# Patient Record
Sex: MALE | Race: BLACK OR AFRICAN AMERICAN
[De-identification: names, ages, dates, MRNs, and addresses within clinical notes are randomized per-mention and may not be internally consistent; named-entity substitution may affect disease eponyms.]

---

## 2020-07-28 ENCOUNTER — HOSPITAL ENCOUNTER (EMERGENCY)
Dept: HOSPITAL 97 - ER | Age: 30
Discharge: HOME | End: 2020-07-28
Payer: SELF-PAY

## 2020-07-28 VITALS — SYSTOLIC BLOOD PRESSURE: 132 MMHG | OXYGEN SATURATION: 97 % | DIASTOLIC BLOOD PRESSURE: 77 MMHG | TEMPERATURE: 97.3 F

## 2020-07-28 DIAGNOSIS — R36.9: Primary | ICD-10-CM

## 2020-07-28 PROCEDURE — 96372 THER/PROPH/DIAG INJ SC/IM: CPT

## 2020-07-28 PROCEDURE — 99283 EMERGENCY DEPT VISIT LOW MDM: CPT

## 2020-07-28 NOTE — EDPHYS
Physician Documentation                                                                           

 [facility]                                                                 

Name: Theron Dowell                                                                           

Age: 29 yrs                                                                                       

Sex: Male                                                                                         

: 1990                                                                                   

MRN: [id number]                                                                                   

Arrival Date: 2020                                                                          

Time: 13:11                                                                                       

Account#: [id number]                                                                            

Bed 26                                                                                            

Private MD:                                                                                       

ED Physician Mervin Duarte                                                                      

HPI:                                                                                              

                                                                                             

14:11 This 29 yrs old Black Male presents to ER via Ambulatory with complaints of STD         malinda 

      Exposure.                                                                                   

14:11 The patient presents with urinary symptoms, dribbling of urine, dysuria. Onset: The     malinda 

      symptoms/episode began/occurred 3 day(s) ago. Modifying factors: The symptoms are           

      alleviated by nothing, the symptoms are aggravated by urinating, sexual intercourse.        

      Associated signs and symptoms: The patient has no apparent associated signs or              

      symptoms. Severity of symptoms: At their worst the symptoms were mild, in the emergency     

      department the symptoms are unchanged. The patient has not experienced similar symptoms     

      in the past.                                                                                

                                                                                                  

Historical:                                                                                       

- Allergies:                                                                                      

13:16 No Known Allergies;                                                                     tw2 

- Home Meds:                                                                                      

13:16 None [Active];                                                                          tw2 

- PMHx:                                                                                           

13:16 None;                                                                                   tw2 

- PSHx:                                                                                           

13:16 None;                                                                                   tw2 

                                                                                                  

- Immunization history:: Adult Immunizations.                                                     

- Social history:: Smoking status: Patient denies any tobacco usage or history of.                

- Family history:: not pertinent.                                                                 

                                                                                                  

                                                                                                  

ROS:                                                                                              

14:11 Constitutional: Negative for fever, chills, and weight loss, Eyes: Negative for injury, malinda 

      pain, redness, and discharge, ENT: Negative for injury, pain, and discharge, Neck:          

      Negative for injury, pain, and swelling, Cardiovascular: Negative for chest pain,           

      palpitations, and edema, Respiratory: Negative for shortness of breath, cough,              

      wheezing, and pleuritic chest pain, Abdomen/GI: Negative for abdominal pain, nausea,        

      vomiting, diarrhea, and constipation, Back: Negative for injury and pain, MS/Extremity:     

      Negative for injury and deformity, Skin: Negative for injury, rash, and discoloration,      

      Neuro: Negative for headache, weakness, numbness, tingling, and seizure, Psych:             

      Negative for depression, anxiety, suicide ideation, homicidal ideation, and                 

      hallucinations, Allergy/Immunology: Negative for hives, rash, and allergies, Endocrine:     

      Negative for neck swelling, polydipsia, polyuria, polyphagia, and marked weight             

      changes, Hematologic/Lymphatic: Negative for swollen nodes, abnormal bleeding, and          

      unusual bruising.                                                                           

14:11 : Positive for urinary symptoms, urinary frequency, burning with urination, penile        

      discharge.                                                                                  

                                                                                                  

Exam:                                                                                             

14:11 Constitutional:  This is a well developed, well nourished patient who is awake, alert,  malinda 

      and in no acute distress. Head/Face:  Normocephalic, atraumatic. Eyes:  Pupils equal        

      round and reactive to light, extra-ocular motions intact.  Lids and lashes normal.          

      Conjunctiva and sclera are non-icteric and not injected.  Cornea within normal limits.      

      Periorbital areas with no swelling, redness, or edema. ENT:  Nares patent. No nasal         

      discharge, no septal abnormalities noted.  Tympanic membranes are normal and external       

      auditory canals are clear.  Oropharynx with no redness, swelling, or masses, exudates,      

      or evidence of obstruction, uvula midline.  Mucous membranes moist. Neck:  Trachea          

      midline, no thyromegaly or masses palpated, and no cervical lymphadenopathy.  Supple,       

      full range of motion without nuchal rigidity, or vertebral point tenderness.  No            

      Meningismus. Chest/axilla:  Normal chest wall appearance and motion.  Nontender with no     

      deformity.  No lesions are appreciated. Cardiovascular:  Regular rate and rhythm with a     

      normal S1 and S2.  No gallops, murmurs, or rubs.  Normal PMI, no JVD.  No pulse             

      deficits. Respiratory:  Lungs have equal breath sounds bilaterally, clear to                

      auscultation and percussion.  No rales, rhonchi or wheezes noted.  No increased work of     

      breathing, no retractions or nasal flaring. Abdomen/GI:  Soft, non-tender, with normal      

      bowel sounds.  No distension or tympany.  No guarding or rebound.  No evidence of           

      tenderness throughout. Back:  No spinal tenderness.  No costovertebral tenderness.          

      Full range of motion. Skin:  Warm, dry with normal turgor.  Normal color with no            

      rashes, no lesions, and no evidence of cellulitis. MS/ Extremity:  Pulses equal, no         

      cyanosis.  Neurovascular intact.  Full, normal range of motion. Neuro:  Awake and           

      alert, GCS 15, oriented to person, place, time, and situation.  Cranial nerves II-XII       

      grossly intact.  Motor strength 5/5 in all extremities.  Sensory grossly intact.            

      Cerebellar exam normal.  Normal gait. Psych:  Awake, alert, with orientation to person,     

      place and time.  Behavior, mood, and affect are within normal limits.                       

14:11 : CVA tenderness, is absent, Male external genitalia: Circumcision noted. Bladder: is     

      normal, Sexual behavior: the patient is sexually active, and reports multiple partners.     

                                                                                                  

Vital Signs:                                                                                      

13:14  / 77; Pulse 74; Resp 17; Temp 97.3(TE); Pulse Ox 97% on R/A; Weight 90.72 kg     tw2 

      (R); Height 5 ft. 10 in. (177.80 cm); Pain 0/10;                                            

13:14 Body Mass Index 28.70 (90.72 kg, 177.80 cm)                                             tw2 

                                                                                                  

MDM:                                                                                              

13:42 Patient medically screened.                                                             malinda 

14:14 Data reviewed: vital signs, nurses notes.                                               [facility] 

14:14 Differential diagnosis: UTI, prostatitis, urethritis. Data interpreted: Cardiac         malinda 

      monitor: not applicable for this patient encounter. rate is 74 beats/min, Pulse             

      oximetry: on room air is 97 %. Counseling: I had a detailed discussion with the patient     

      and/or guardian regarding: the historical points, exam findings, and any diagnostic         

      results supporting the discharge/admit diagnosis, the need for outpatient follow up,        

      for definitive care, a family practitioner. ED course: no sex, follow up pcp.               

                                                                                                  

Administered Medications:                                                                         

14:33 Drug: Rocephin (cefTRIAXone) 500 mg Route: IM; Site: right ventrogluteal;               vc  

14:50 Follow up: Response: No adverse reaction                                                vc  

14:33 Drug: Cipro 500 mg Route: PO;                                                           vc  

14:50 Follow up: Response: No adverse reaction                                                vc  

14:33 Drug: Zithromax 1 grams Route: PO;                                                      vc  

14:50 Follow up: Response: No adverse reaction                                                vc  

                                                                                                  

                                                                                                  

Disposition:                                                                                      

20 14:16 Discharged to Home. Impression: Urethral discharge, Urethral discharge,            

  unspecified.                                                                                    

- Condition is Stable.                                                                            

- Discharge Instructions: Sexually Transmitted Disease, Sexually Transmitted Disease,             

  Easy-to-Read, Urethritis, Adult.                                                                

- Prescriptions for Doxycycline Hyclate 100 mg Oral Tablet - take 1 tablet by ORAL                

  route every 12 hours; 20 tablet.                                                                

- Medication Reconciliation Form, Thank You Letter, Antibiotic Education, Prescription            

  Opioid Use form.                                                                                

- Follow up: Private Physician; When: 2 - 3 days; Reason: Recheck today's complaints,             

  Continuance of care, Re-evaluation by your physician.                                           

- Problem is new.                                                                                 

- Symptoms have improved.                                                                         

                                                                                                  

                                                                                                  

                                                                                                  

Signatures:                                                                                       

Mervin Duarte MD MD cha Wise, Tara, RN                          RN   tw2                                                  

Catarina Stubbs RN                    RN   vc                                                   

                                                                                                  

Corrections: (The following items were deleted from the chart)                                    

14:53 14:16 2020 14:16 Discharged to Home. Impression: Urethral discharge; Urethral     vc  

      discharge, unspecified. Condition is Stable. Forms are Medication Reconciliation Form,      

      Thank You Letter, Antibiotic Education, Prescription Opioid Use. Follow up: Private         

      Physician; When: 2 - 3 days; Reason: Recheck today's complaints, Continuance of care,       

      Re-evaluation by your physician. Problem is new. Symptoms have improved. malinda                

                                                                                                  

**************************************************************************************************

## 2020-07-28 NOTE — ER
Nurse's Notes                                                                                     

 [facility]                                                                 

Name: Theron Dowell                                                                           

Age: 29 yrs                                                                                       

Sex: Male                                                                                         

: 1990                                                                                   

MRN: [id number]                                                                                   

Arrival Date: 2020                                                                          

Time: 13:11                                                                                       

Account#: [id number]                                                                            

Bed 26                                                                                            

Private MD:                                                                                       

Diagnosis: Urethral discharge;Urethral discharge, unspecified                                     

                                                                                                  

Presentation:                                                                                     

                                                                                             

13:14 Chief complaint: Patient states: i think i have a sexually transmitted disease, i had   tw2 

      sex with the same girl last week as my friends and we think it is gonorrhea, i have         

      been leaking green stuff as well. Coronavirus screen: Patient denies a cough. Patient       

      denies shortness of breath or difficulty breathing. Patient denies measured and/or          

      subjective temperature greater than 100.4F prior to today's visit. Patient denies           

      travel on a cruise ship or to a country the [facility] currently lists as an affected area.        

      Patient denies contact with known and/or suspected case of COVID-19. Ebola Screen:          

      Patient denies travel to an Ebola-affected area in the 21 days before illness onset.        

      Initial Sepsis Screen: Does the patient meet any 2 criteria? No. Patient's initial          

      sepsis screen is negative. Does the patient have a suspected source of infection? No.       

      Patient's initial sepsis screen is negative. Risk Assessment: Do you want to hurt           

      yourself or someone else? Patient reports no desire to harm self or others. Onset of        

      symptoms was 2020.                                                                 

13:14 Method Of Arrival: Ambulatory                                                           tw2 

13:14 Acuity: YG 4                                                                           tw2 

                                                                                                  

Triage Assessment:                                                                                

13:18 General: Appears in no apparent distress. Behavior is calm, cooperative, appropriate    tw2 

      for age. Pain: Denies pain.                                                                 

                                                                                                  

Historical:                                                                                       

- Allergies:                                                                                      

13:16 No Known Allergies;                                                                     tw2 

- Home Meds:                                                                                      

13:16 None [Active];                                                                          tw2 

- PMHx:                                                                                           

13:16 None;                                                                                   tw2 

- PSHx:                                                                                           

13:16 None;                                                                                   tw2 

                                                                                                  

- Immunization history:: Adult Immunizations.                                                     

- Social history:: Smoking status: Patient denies any tobacco usage or history of.                

- Family history:: not pertinent.                                                                 

                                                                                                  

                                                                                                  

Screenin:37 Abuse screen: Denies threats or abuse. Nutritional screening: No deficits noted.        tw2 

      Tuberculosis screening: No symptoms or risk factors identified. Fall Risk None              

      identified.                                                                                 

                                                                                                  

Assessment:                                                                                       

13:36 General: Appears in no apparent distress. Behavior is calm, cooperative, appropriate    tw2 

      for age. Pain: Denies pain. Neuro: Level of Consciousness is awake, alert, obeys            

      commands, Oriented to person, place, time, situation. Cardiovascular: Patient's skin is     

      warm and dry. Respiratory: Airway is patent Respiratory effort is even, unlabored,          

      Respiratory pattern is regular, symmetrical. GI: No signs and/or symptoms were reported     

      involving the gastrointestinal system. : Reports discharge, from penis that is green,     

      yellow. EENT: No signs and/or symptoms were reported regarding the EENT system. Derm:       

      No signs and/or symptoms reported regarding the dermatologic system. Musculoskeletal:       

      Range of motion: intact in all extremities.                                                 

14:34 Reassessment: Discharge pending shot time.                                              vc  

                                                                                                  

Vital Signs:                                                                                      

13:14  / 77; Pulse 74; Resp 17; Temp 97.3(TE); Pulse Ox 97% on R/A; Weight 90.72 kg     tw2 

      (R); Height 5 ft. 10 in. (177.80 cm); Pain 0/10;                                            

13:14 Body Mass Index 28.70 (90.72 kg, 177.80 cm)                                             tw2 

                                                                                                  

ED Course:                                                                                        

13:11 Patient arrived in ED.                                                                  fj1 

13:15 Triage completed.                                                                       tw2 

13:15 Arm band placed on.                                                                     tw2 

13:37 Bed in low position. Call light in reach.                                               tw2 

13:41 Mervin Duarte MD is Attending Physician.                                             malinda 

14:13 Catarina Stubbs, RN is Primary Nurse.                                                  vc  

14:52 No provider procedures requiring assistance completed. Patient did not have IV access   vc  

      during this emergency room visit.                                                           

                                                                                                  

Administered Medications:                                                                         

14:33 Drug: Rocephin (cefTRIAXone) 500 mg Route: IM; Site: right ventrogluteal;               vc  

14:50 Follow up: Response: No adverse reaction                                                vc  

14:33 Drug: Cipro 500 mg Route: PO;                                                           vc  

14:50 Follow up: Response: No adverse reaction                                                vc  

14:33 Drug: Zithromax 1 grams Route: PO;                                                      vc  

14:50 Follow up: Response: No adverse reaction                                                vc  

                                                                                                  

                                                                                                  

Outcome:                                                                                          

14:16 Discharge ordered by MD.                                                                malinda 

14:52 Discharged to home ambulatory.                                                          vc  

14:52 Condition: good                                                                             

14:52 Discharge instructions given to patient, Instructed on discharge instructions, follow       

      up and referral plans. medication usage, Demonstrated understanding of instructions,        

      follow-up care, medications, Prescriptions given X 1.                                       

14:53 Patient left the ED.                                                                    vc  

                                                                                                  

Signatures:                                                                                       

Mervin Duarte MD MD cha Wise, Tara RN                          RN   tw2                                                  

Catarina Stubbs RN                    RN   Reggie Mcnulty                                 fj1                                                  

                                                                                                  

**************************************************************************************************

## 2021-01-12 ENCOUNTER — HOSPITAL ENCOUNTER (EMERGENCY)
Dept: HOSPITAL 97 - ER | Age: 31
Discharge: HOME | End: 2021-01-12
Payer: SELF-PAY

## 2021-01-12 VITALS — SYSTOLIC BLOOD PRESSURE: 148 MMHG | DIASTOLIC BLOOD PRESSURE: 96 MMHG

## 2021-01-12 VITALS — OXYGEN SATURATION: 99 % | TEMPERATURE: 97.8 F

## 2021-01-12 DIAGNOSIS — A64: Primary | ICD-10-CM

## 2021-01-12 PROCEDURE — 99283 EMERGENCY DEPT VISIT LOW MDM: CPT

## 2021-01-12 PROCEDURE — 96372 THER/PROPH/DIAG INJ SC/IM: CPT

## 2021-01-12 NOTE — XMS REPORT
Continuity of Care Document

                           Created on:2021



Patient:MARIA M CONTE

Sex:Male

:1990

External Reference #:[id number]





Demographics







                          Address                   [address] [place] [address]

 

                          Home Phone                [phone]

 

                          Work Phone                [phone]

 

                          Mobile Phone              [phone]

 

                          Email Address             NONE

 

                          Preferred Language        English

 

                          Marital Status            Unknown

 

                          Gnosticism Affiliation     BAP

 

                          Race                      Unknown

 

                          Additional Race(s)        Unavailable



                                                    Black or 

 

                          Ethnic Group              Not  or 









Author







                          Organization              [facility]

t

 

                          Address                   [address]



                                                    [place] [address]

 

                          Phone                     [phone]









Support







                Name            Relationship    Address         Phone

 

                Contact         Other           Unavailable     [phone]

 

                Delmer         Mother          Unavailable     [phone]









Care Team Providers







                    Name                Role                Phone

 

                    Fabrice PUENTE          Attending Clinician [phone]









Problems

This patient has no known problems.



Allergies, Adverse Reactions, Alerts

This patient has no known allergies or adverse reactions.



Medications

This patient has no known medications.



Procedures

This patient has no known procedures.



Encounters







        Start   End     Encounter Admission Attending Care    Care    Encounter 

Source



        Date/Time Date/Time Type    Type    Clinicians Facility Department ID   

   

 

        2020 Emergency         MARBIN Avina    [phone] [id number]

[id number] 



        14:10:58 15:32:00                 Moira Andre [id number]         



                                                [place] [id number]         



                                                [place]  [phone]         



                                                        084             







Results

This patient has no known results.

## 2021-01-12 NOTE — EDPHYS
Physician Documentation                                                                           

 [facility]                                                                 

Name: Theron Dowell                                                                           

Age: 30 yrs                                                                                       

Sex: Male                                                                                         

: 1990                                                                                   

MRN: [id number]                                                                                   

Arrival Date: 2021                                                                          

Time: 16:51                                                                                       

Account#: [id number]                                                                            

Bed Waiting                                                                                       

Private MD:                                                                                       

Mervin Nance                                                                      

HPI:                                                                                              

                                                                                             

17:01 This 30 yrs old Black Male presents to ER via Ambulatory with complaints of STD         kb  

      Exposure.                                                                                   

17:01 The patient presents with a possible STD exposure, symptoms include dysuria, green      kb  

      penile discharge. Onset: The symptoms/episode began/occurred 2 day(s) ago. Modifying        

      factors: The symptoms are alleviated by nothing, the symptoms are aggravated by             

      nothing. Associated signs and symptoms: The patient has no apparent associated signs or     

      symptoms. Severity of symptoms: At their worst the symptoms were moderate, in the           

      emergency department the symptoms are unchanged. The patient has not experienced            

      similar symptoms in the past. The patient has not recently seen a physician. Pt reports     

      he believes he has gonorrhea because he has had burning with urination and green            

      discharge for 2 days.                                                                       

                                                                                                  

Historical:                                                                                       

- Allergies:                                                                                      

16:57 No Known Allergies;                                                                     ss  

- Home Meds:                                                                                      

16:57 None [Active];                                                                          ss  

- PMHx:                                                                                           

16:57 None;                                                                                   ss  

- PSHx:                                                                                           

16:57 None;                                                                                   ss  

                                                                                                  

- Immunization history:: Adult Immunizations up to date.                                          

- Social history:: Smoking status: Patient denies any tobacco usage or history of.                

                                                                                                  

                                                                                                  

ROS:                                                                                              

17:00 Constitutional: Negative for fever, chills, and weight loss, Cardiovascular: Negative   kb  

      for chest pain, palpitations, and edema, Respiratory: Negative for shortness of breath,     

      cough, wheezing, and pleuritic chest pain, Abdomen/GI: Negative for abdominal pain,         

      nausea, vomiting, diarrhea, and constipation, MS/Extremity: Negative for injury and         

      deformity, Skin: Negative for injury, rash, and discoloration, Neuro: Negative for          

      headache, weakness, numbness, tingling, and seizure.                                        

17:00 : Positive for burning with urination, penile discharge.                                  

                                                                                                  

Exam:                                                                                             

17:00 Constitutional:  This is a well developed, well nourished patient who is awake, alert,  kb  

      and in no acute distress. Head/Face:  Normocephalic, atraumatic. Chest/axilla:  Normal      

      chest wall appearance and motion.  Nontender with no deformity.  No lesions are             

      appreciated. Cardiovascular:  Regular rate and rhythm with a normal S1 and S2.  No          

      gallops, murmurs, or rubs.  Normal PMI, no JVD.  No pulse deficits. Respiratory:  Lungs     

      have equal breath sounds bilaterally, clear to auscultation and percussion.  No rales,      

      rhonchi or wheezes noted.  No increased work of breathing, no retractions or nasal          

      flaring. Abdomen/GI:  Soft, non-tender, with normal bowel sounds.  No distension or         

      tympany.  No guarding or rebound.  No evidence of tenderness throughout. Skin:  Warm,       

      dry with normal turgor.  Normal color with no rashes, no lesions, and no evidence of        

      cellulitis. MS/ Extremity:  Pulses equal, no cyanosis.  Neurovascular intact.  Full,        

      normal range of motion. Neuro:  Awake and alert, GCS 15, oriented to person, place,         

      time, and situation.  Cranial nerves II-XII grossly intact.  Motor strength 5/5 in all      

      extremities.  Sensory grossly intact.  Cerebellar exam normal.  Normal gait.                

                                                                                                  

Vital Signs:                                                                                      

16:57 Pulse 89; Resp 16; Temp 97.8(TE); Pulse Ox 99% on R/A; Weight 92.99 kg; Height 5 ft. 11 ss  

      in. (180.34 cm); Pain 0/10;                                                                 

16:59  / 96;                                                                            ss  

16:57 Body Mass Index 28.59 (92.99 kg, 180.34 cm)                                             ss  

                                                                                                  

MDM:                                                                                              

17:00 Data reviewed: vital signs, nurses notes. Data interpreted: Pulse oximetry: on room air kb  

      is 99 %. Interpretation: normal. Counseling: I had a detailed discussion with the           

      patient and/or guardian regarding: the historical points, exam findings, and any            

      diagnostic results supporting the discharge/admit diagnosis, the need for outpatient        

      follow up, a family practitioner, to return to the emergency department if symptoms         

      worsen or persist or if there are any questions or concerns that arise at home.             

17:03 Patient medically screened.                                                             kb  

                                                                                                  

Administered Medications:                                                                         

17:08 Drug: Zithromax 1 grams Route: PO;                                                      ss  

17:25 Follow up: Response: No adverse reaction                                                ss  

17:08 Drug: Rocephin (cefTRIAXone) 250 mg Route: IM; Site: left gluteus;                      ss  

17:25 Follow up: Response: No adverse reaction; Medication administered at discharge.           

                                                                                                  

                                                                                                  

Disposition:                                                                                      

18:40 Co-signature as Attending Physician, Mervin Duarte MD I agree with the assessment and  malinda 

      plan of care.                                                                               

                                                                                                  

Disposition:                                                                                      

21 17:03 Discharged to Home. Impression: Unspecified sexually transmitted disease.          

- Condition is Stable.                                                                            

- Discharge Instructions: Sexually Transmitted Disease, Easy-to-Read.                             

                                                                                                  

- Medication Reconciliation Form, Thank You Letter, Antibiotic Education, Prescription            

  Opioid Use form.                                                                                

- Follow up: Emergency Department; When: As needed; Reason: Worsening of condition.               

  Follow up: Private Physician; When: 2 - 3 days; Reason: Recheck today's complaints,             

  Continuance of care, Re-evaluation by your physician.                                           

                                                                                                  

                                                                                                  

                                                                                                  

Signatures:                                                                                       

Stephenie Hoffmann, FNP-C                 FNP-Evanb                                                   

Mervin Duarte MD MD cha Smirch, Shelby, RN                      RN   ss                                                   

                                                                                                  

Corrections: (The following items were deleted from the chart)                                    

17:26 17:03 2021 17:03 Discharged to Home. Impression: Unspecified sexually transmitted ss  

      disease. Condition is Stable. Forms are Medication Reconciliation Form, Thank You           

      Letter, Antibiotic Education, Prescription Opioid Use. Follow up: Emergency Department;     

      When: As needed; Reason: Worsening of condition. Follow up: Private Physician; When: 2      

      - 3 days; Reason: Recheck today's complaints, Continuance of care, Re-evaluation by         

      your physician. kb                                                                          

                                                                                                  

**************************************************************************************************

## 2021-01-12 NOTE — ER
Nurse's Notes                                                                                     

 [facility]                                                                 

Name: Theron Dowell                                                                           

Age: 30 yrs                                                                                       

Sex: Male                                                                                         

: 1990                                                                                   

MRN: [id number]                                                                                   

Arrival Date: 2021                                                                          

Time: 16:51                                                                                       

Account#: [id number]                                                                            

Bed Waiting                                                                                       

Private MD:                                                                                       

Diagnosis: Unspecified sexually transmitted disease                                               

                                                                                                  

Presentation:                                                                                     

                                                                                             

16:56 Chief complaint: Patient states: "I think I got gonorrhea. I've been having burning     ss  

      with urination.". Coronavirus screen: Client denies travel out of the [place] in the last      

      14 days. Ebola Screen: Patient denies exposure to infectious person. Patient denies         

      travel to an Ebola-affected area in the 21 days before illness onset. Initial Sepsis        

      Screen: Does the patient meet any 2 criteria? No. Patient's initial sepsis screen is        

      negative. Does the patient have a suspected source of infection? No. Patient's initial      

      sepsis screen is negative. Risk Assessment: Do you want to hurt yourself or someone         

      else? Patient reports no desire to harm self or others. Onset of symptoms was January       

      10, 2021.                                                                                   

16:56 Method Of Arrival: Ambulatory                                                           ss  

16:56 Acuity: YG 4                                                                           ss  

                                                                                                  

Historical:                                                                                       

- Allergies:                                                                                      

16:57 No Known Allergies;                                                                     ss  

- Home Meds:                                                                                      

16:57 None [Active];                                                                          ss  

- PMHx:                                                                                           

16:57 None;                                                                                   ss  

- PSHx:                                                                                           

16:57 None;                                                                                   ss  

                                                                                                  

- Immunization history:: Adult Immunizations up to date.                                          

- Social history:: Smoking status: Patient denies any tobacco usage or history of.                

                                                                                                  

                                                                                                  

Screenin:56 Abuse screen: Denies threats or abuse. Denies injuries from another. Nutritional        ss  

      screening: No deficits noted. Tuberculosis screening: No symptoms or risk factors           

      identified. Fall Risk None identified.                                                      

                                                                                                  

Assessment:                                                                                       

16:56 General: Appears in no apparent distress. comfortable, Behavior is calm, cooperative.   ss  

      General: Denies fever, feeling ill, fatigue, chills. Pain: Denies pain. Neuro: Level of     

      Consciousness is awake, alert, obeys commands, Oriented to person, place, time,             

      situation. Cardiovascular: Capillary refill < 3 seconds is brisk in bilateral fingers.      

      Respiratory: Airway is patent Respiratory effort is even, unlabored, Respiratory            

      pattern is regular, symmetrical. GI: Patient currently denies. : Reports discharge,       

      from penis that is since x 2 days. EENT: Nares are clear. Derm: Skin is intact, is          

      healthy with good turgor, Skin is pink, warm \T\ dry. normal. Musculoskeletal:              

      Circulation, motion, and sensation intact. Range of motion: intact in all extremities,      

      Swelling absent.                                                                            

                                                                                                  

Vital Signs:                                                                                      

16:57 Pulse 89; Resp 16; Temp 97.8(TE); Pulse Ox 99% on R/A; Weight 92.99 kg; Height 5 ft. 11 ss  

      in. (180.34 cm); Pain 0/10;                                                                 

16:59  / 96;                                                                            ss  

16:57 Body Mass Index 28.59 (92.99 kg, 180.34 cm)                                             ss  

                                                                                                  

ED Course:                                                                                        

16:51 Patient arrived in ED.                                                                  as  

16:54 [facility]                                                        kb  

16:54 Mervin Duarte MD is Attending Physician.                                             kb  

16:56 Triage completed.                                                                       ss  

16:56 Patient has correct armband on for positive identification. Bed in low position. Call   ss  

      light in reach.                                                                             

16:56 No provider procedures requiring assistance completed. Patient did not have IV access   ss  

      during this emergency room visit.                                                           

16:57 Arm band placed on right wrist.                                                         ss  

17:25 Zonia Stearns, RN is Primary Nurse.                                                    ss  

                                                                                                  

Administered Medications:                                                                         

17:08 Drug: Zithromax 1 grams Route: PO;                                                      ss  

17:25 Follow up: Response: No adverse reaction                                                ss  

17:08 Drug: Rocephin (cefTRIAXone) 250 mg Route: IM; Site: left gluteus;                      ss  

17:25 Follow up: Response: No adverse reaction; Medication administered at discharge.         ss  

                                                                                                  

                                                                                                  

Outcome:                                                                                          

17:03 Discharge ordered by MD.                                                                kb  

17:26 Discharged to home ambulatory.                                                          ss  

17:26 Condition: good                                                                             

17:26 Discharge instructions given to patient, family, Instructed on discharge instructions,      

      follow up and referral plans. Demonstrated understanding of instructions, follow-up         

      care.                                                                                       

17:26 Patient left the ED.                                                                    ss  

                                                                                                  

Signatures:                                                                                       

Stephenie Hoffmann FNP-C FNP-Maria Esther Mejia                             as                                                   

Zonia Stearns, RN                      RN   ss                                                   

                                                                                                  

**************************************************************************************************

## 2022-04-20 ENCOUNTER — HOSPITAL ENCOUNTER (EMERGENCY)
Dept: HOSPITAL 97 - ER | Age: 32
Discharge: HOME | End: 2022-04-20
Payer: SELF-PAY

## 2022-04-20 VITALS — TEMPERATURE: 97.9 F | OXYGEN SATURATION: 100 %

## 2022-04-20 VITALS — SYSTOLIC BLOOD PRESSURE: 129 MMHG | DIASTOLIC BLOOD PRESSURE: 86 MMHG

## 2022-04-20 DIAGNOSIS — F17.210: ICD-10-CM

## 2022-04-20 DIAGNOSIS — N34.2: Primary | ICD-10-CM

## 2022-04-20 PROCEDURE — 96372 THER/PROPH/DIAG INJ SC/IM: CPT

## 2022-04-20 PROCEDURE — 81003 URINALYSIS AUTO W/O SCOPE: CPT

## 2022-04-20 PROCEDURE — 99283 EMERGENCY DEPT VISIT LOW MDM: CPT

## 2022-04-20 NOTE — ER
Nurse's Notes                                                                                     

 [facility]                                                                 

Name: Theron Dowell                                                                           

Age: 31 yrs                                                                                       

Sex: Male                                                                                         

: 1990                                                                                   

MRN: [id number]                                                                                   

Arrival Date: 2022                                                                          

Time: 10:07                                                                                       

Account#: [id number]                                                                            

Bed Treatment                                                                                     

Private MD:                                                                                       

Diagnosis: Other urethritis-gc;Dysuria                                                            

                                                                                                  

Presentation:                                                                                     

                                                                                             

10:36 Chief complaint: Patient states: Pt c/o burning with urination and green discharge from ab2 

      penis for 3 days now. Coronavirus screen: Vaccine status: Patient reports being             

      unvaccinated. Client denies travel out of the [place] in the last 14 days. At this time,       

      the client does not indicate any symptoms associated with coronavirus-19. Ebola Screen:     

      Patient negative for fever greater than or equal to 101.5 degrees Fahrenheit, and           

      additional compatible Ebola Virus Disease symptoms Patient denies exposure to               

      infectious person. Patient denies travel to an Ebola-affected area in the 21 days           

      before illness onset. No symptoms or risks identified at this time. Initial Sepsis          

      Screen: Does the patient meet any 2 criteria? No. Patient's initial sepsis screen is        

      negative. Does the patient have a suspected source of infection? No. Patient's initial      

      sepsis screen is negative. Risk Assessment: Do you want to hurt yourself or someone         

      else? Patient reports no desire to harm self or others. Onset of symptoms is unknown.       

10:36 Method Of Arrival: Ambulatory                                                           ab2 

10:36 Acuity: YG 4                                                                           ab2 

                                                                                                  

Historical:                                                                                       

- Allergies:                                                                                      

10:37 No Known Allergies;                                                                     ab2 

- PMHx:                                                                                           

10:37 None;                                                                                   ab2 

                                                                                                  

- Immunization history:: Adult Immunizations up to date.                                          

- Social history:: Smoking status: Patient reports the use of cigarette tobacco                   

  products, smokes one-half pack cigarettes per day.                                              

- Family history:: not pertinent.                                                                 

                                                                                                  

                                                                                                  

Screening:                                                                                        

10:38 Abuse screen: Denies threats or abuse. Denies injuries from another. Nutritional        ab2 

      screening: No deficits noted. Tuberculosis screening: No symptoms or risk factors           

      identified. Fall Risk None identified.                                                      

                                                                                                  

Assessment:                                                                                       

10:37 General: Appears in no apparent distress. comfortable, Behavior is calm, cooperative,   ab2 

      appropriate for age. Pain: Complains of pain in pelvis Pain does not radiate. Pain          

      currently is 3 out of 10 on a pain scale. Neuro: Level of Consciousness is awake,           

      alert, obeys commands, Oriented to person, place, time, situation, Appropriate for age      

       are equal bilaterally Moves all extremities. Gait is steady, Speech is normal,        

      Facial symmetry appears normal. Cardiovascular: No deficits noted. Denies chest pain,       

      shortness of breath, Heart tones S1 S2 present Patient's skin is warm and dry.              

      Respiratory: Airway is patent Respiratory effort is even, unlabored, Respiratory            

      pattern is regular, symmetrical, Breath sounds are clear bilaterally. GI: No deficits       

      noted. No signs and/or symptoms were reported involving the gastrointestinal system.        

      : Reports burning with urination, discharge, from penis that is green, Patient is         

      sexually active. Derm: No deficits noted. No signs and/or symptoms reported regarding       

      the dermatologic system.                                                                    

                                                                                                  

Vital Signs:                                                                                      

10:36  / 89; Pulse 72; Resp 18; Temp 97.9; Pulse Ox 100% on R/A; Weight 88.45 kg;       ab2 

      Height 5 ft. 11 in. (180.34 cm); Pain 3/10;                                                 

10:55  / 86; Pulse 74; Resp 17; Pulse Ox 100% on R/A;                                   ab2 

10:36 Body Mass Index 27.20 (88.45 kg, 180.34 cm)                                             ab2 

                                                                                                  

ED Course:                                                                                        

10:07 Patient arrived in ED.                                                                  am2 

10:19 Mervin Duarte MD is Attending Physician.                                             [facility] 

10:23 Hernesto Olmos is Primary Nurse.                                                    ab2 

10:37 Triage completed.                                                                       ab2 

10:38 Arm band placed on right wrist.                                                         ab2 

10:38 No provider procedures requiring assistance completed.                                  ab2 

10:39 Patient has correct armband on for positive identification. Bed in low position. Call   ab2 

      light in reach. Side rails up X2.                                                           

10:55 Patient did not have IV access during this emergency room visit.                        ab2 

                                                                                                  

Administered Medications:                                                                         

10:35 Drug: Doxycycline 200 mg Route: PO;                                                     ab2 

10:36 Drug: Rocephin (cefTRIAXone) 1 grams Route: IM; Site: left vastus lateralis;            ab2 

10:36 Drug: Zithromax (azithromycin) 1 grams Route: PO;                                       ab2 

                                                                                                  

                                                                                                  

Outcome:                                                                                          

10:37 Discharge ordered by MD.                                                                malinda 

10:55 Discharged to home ambulatory.                                                          ab2 

10:55 Condition: good                                                                             

10:55 Discharge instructions given to patient, Instructed on discharge instructions, follow       

      up and referral plans. medication usage, safe sex practices, Demonstrated understanding     

      of instructions, follow-up care, medications, Prescriptions given X 1.                      

10:55 Patient left the ED.                                                                    ab2 

                                                                                                  

Signatures:                                                                                       

Mervin Duarte MD MD cha Moreno, Amanda am2 Bleininger, Alexis                           ab2                                                  

                                                                                                  

**************************************************************************************************

## 2022-04-20 NOTE — XMS REPORT
Continuity of Care Document

                            Created on:2022



Patient:MARIA M CONTE

Sex:Male

:1990

External Reference #:[id number]





Demographics







                          Address                   [address]



                                                    [place] [address]

 

                          Home Phone                [phone]

 

                          Mobile Phone              [phone]

 

                          Email Address             NONE

 

                          Preferred Language        English

 

                          Marital Status            Unknown

 

                          [employer] Affiliation     B[employer]

 

                          Race                      Unknown

 

                          Additional Race(s)        Black or 

 

                          Ethnic Group              Not  or 









Author







                          Organization              [facility]

t

 

                          Address                   [address]



                                                    [place] [address]

 

                          Phone                     [phone]









Support







                Name            Relationship    Address         Phone

 

                Contact         Other           Unavailable     [phone]

 

                Delmer         Mother          Unavailable     [phone]









Care Team Providers







                    Name                Role                Phone

 

                    Pcp,  Does Not Have A Primary Care Physician [phone]

 

                    KENZIE Saleh DO     Attending Clinician [phone]

 

                    KENZIE SALEH        Attending Clinician Unavailable

 

                    Fabrice OWENSP          Attending Clinician [phone]

 

                    FABRICE              Attending Clinician Unavailable









Problems







       Condition Condition Condition Status Onset  Resolution Last   Treating Co

mments 

Source



       Name   Details Category        Date   Date   Treatment Clinician        



                                                 Date                 

 

       No known No known Disease                                           Unive

rs



       active active                                                  ity of



       problems problems                                                  [facility]







Allergies, Adverse Reactions, Alerts







       Allergy Allergy Status Severity Reaction(s) Onset  Inactive Treating Comm

ents 

Source



       Name   Type                        Date   Date   Clinician        

 

       NO KNOWN Drug   Active                                           [facility]



       ALLERGIE Class                                                   ity of



       S                                                              [facility]







Social History







           Social Habit Start Date Stop Date  Quantity   Comments   Source

 

           Exposure to                       Not sure              [facility]

 [place]



           SARS-CoV-2 (event)                                             [facility]

l [facility]

 

           Sex Assigned At 1990                       [facility]



           Birth      00:00:00   00:00:00                         [facility]









                Smoking Status  Start Date      Stop Date       Source

 

                Unknown if ever smoked                                 [facility]







Medications







       Ordered Filled Start  Stop   Current Ordering Indication Dosage Frequency

 Signature

                    Comments            Components          Source



     Medication Medication Date Date Medication? Clinician                (SIG) 

          



     Name Name                                                   

 

     cefTRIAXone            Yes            500mg      500 mg,           Un

shira



     (ROCEPHIN)      2-16                               Intramuscu           ity

 of



     injection      22:00:                               lar, Q24H,           Te

xas



     500 mg      00                                 First dose           Medical



                                                  on Wed           Branch



                                                  22 at           



                                                  1600,           



                                                  Until           



                                                  Discontinu           



                                                  ed,            



                                                  ASAP<br>Re           



                                                  ason for           



                                                  Anti-Infec           



                                                  tive:           



                                                  Empiric           



                                                  Therapy           



                                                  for            



                                                  Suspected           



                                                  Infection<           



                                                  br>Empiric           



                                                  Therapy           



                                                  Site:           



                                                  Pelvic<br>           



                                                  Duration           



                                                  of             



                                                  therapy:           



                                                  72 hours           

 

     azithromyci                   1000mg      1,000 mg,         

  [facility]



     n                                   Oral,           ity of



     (ZITHROMAX)      22:00: 21:04                          ONCE, 1           Te

xas



     tablet      00   :00                           dose, On           Medical



     1,000 mg                                         Wed            Branch



                                                  22 at           



                                                  1600,           



                                                  ASAP<br>Re           



                                                  ason for           



                                                  Anti-Infec           



                                                  tive:           



                                                  Empiric           



                                                  Therapy           



                                                  for            



                                                  Suspected           



                                                  Infection<           



                                                  br>Empiric           



                                                  Therapy           



                                                  Site:           



                                                  Pelvic<br>           



                                                  Duration           



                                                  of             



                                                  therapy:           



                                                  72 hours           

 

     No known            No                                      [facility]



     medications                                                    ity of



               14:50:                                              [address]







Vital Signs







             Vital Name   Observation Time Observation Value Comments     Source

 

             Systolic blood 2022 20:49:00 153 mm[Hg]                [facility]

sity of



             pressure                                            [facility]

 

             Diastolic blood 2022 20:49:00 86 mm[Hg]                 [facility]

 

             Heart rate   2022 20:49:00 81 /min                   [facility]

 

             Body temperature 2022 20:49:00 37.22 Gilda                 [facility]

 

             Respiratory rate 2022 20:49:00 16 /min                   [facility]

 

             Body height  2022 20:49:00 180.3 cm                  [facility]

 

             Body weight  2022 20:49:00 90.719 kg                 [facility]

 

             BMI          2022 20:49:00 27.89 kg/m2               [facility]

 

             Oxygen saturation in 2022 20:49:00 98 /min                   

[facility]



             Arterial blood by                                        [place] [facility]

kirsten



             Pulse oximetry                                        Branch







Procedures







                Procedure       Date / Time Performed Performing Clinician [facility]

e

 

                NOTICE OF PRIVACY 2022 20:48:28 Doctor Unassigned, No Univ

[facility]

 

                CONSENT/REFUSAL FOR 2022 20:45:33 Doctor Unassigned, No [facility]

[facility]



                DIAGNOSIS AND                   Name            Medical Branch



                TREATMENT                                       







Encounters







        Start   End     Encounter Admission Attending Care    Care    Encounter 

Source



        Date/Time Date/Time Type    Type    Clinicians Facility Department ID   

   

 

        2022 [facility]    [id number]

[id number] [facility]



        14:52:00 15:43:00                 Gail ANDRE [id number]         

gogo [facility] [id number]         [facility]  [phone]         [address]

 

        2022 Emergency X       [facility]    ERT     [id number]

[id number] [facility]



        14:52:00 15:43:00                 GAIL bowens [facility]

 

        2020 Emergency         [facility]    [id number]

[id number] 



        14:10:58 15:32:00                 Moira Andre [id number]         



                                                [place] [id number]         



                                                [place]  [phone]         



                                                        [facility]             

 

        2020 Emergency X       [facility]     [id number]

[id number] Univers



        14:10:58 14:10:58                 MOIRA bowens [facility]







Results

This patient has no known results.

## 2022-04-20 NOTE — EDPHYS
Physician Documentation                                                                           

 [facility]                                                                 

Name: Theron Dowell                                                                           

Age: 31 yrs                                                                                       

Sex: Male                                                                                         

: 1990                                                                                   

MRN: [id number]                                                                                   

Arrival Date: 2022                                                                          

Time: 10:07                                                                                       

Account#: [id number]                                                                            

Bed Treatment                                                                                     

Private MD:                                                                                       

Mervin Nance                                                                      

HPI:                                                                                              

                                                                                             

10:33 This 31 yrs old Black Male presents to ER via Un[facility] with complaints of STD         malinda 

      Exposure.                                                                                   

10:33 The patient presents with symptoms include yellow penile discharge, urinary symptoms,   malinda 

      dysuria. Onset: The symptoms/episode began/occurred 3 day(s) ago. Modifying factors:        

      The symptoms are alleviated by nothing, the symptoms are aggravated by urinating.           

      Associated signs and symptoms: The patient has no apparent associated signs or              

      symptoms. Severity of symptoms: At their worst the symptoms were mild, in the emergency     

      department the symptoms are unchanged. The patient has experienced similar episodes in      

      the past, multiple times.                                                                   

                                                                                                  

Historical:                                                                                       

- Allergies:                                                                                      

10:37 No Known Allergies;                                                                     ab2 

- PMHx:                                                                                           

10:37 None;                                                                                   ab2 

                                                                                                  

- Immunization history:: Adult Immunizations up to date.                                          

- Social history:: Smoking status: Patient reports the use of cigarette tobacco                   

  products, smokes one-half pack cigarettes per day.                                              

- Family history:: not pertinent.                                                                 

                                                                                                  

                                                                                                  

ROS:                                                                                              

10:33 Constitutional: Negative for fever, chills, and weight loss, Eyes: Negative for injury, malinda 

      pain, redness, and discharge, ENT: Negative for injury, pain, and discharge, Neck:          

      Negative for injury, pain, and swelling, Cardiovascular: Negative for chest pain,           

      palpitations, and edema, Respiratory: Negative for shortness of breath, cough,              

      wheezing, and pleuritic chest pain, Abdomen/GI: Negative for abdominal pain, nausea,        

      vomiting, diarrhea, and constipation, Back: Negative for injury and pain, MS/Extremity:     

      Negative for injury and deformity, Skin: Negative for injury, rash, and discoloration,      

      Neuro: Negative for headache, weakness, numbness, tingling, and seizure, Psych:             

      Negative for depression, anxiety, suicide ideation, homicidal ideation, and                 

      hallucinations, Allergy/Immunology: Negative for hives, rash, and allergies, Endocrine:     

      Negative for neck swelling, polydipsia, polyuria, polyphagia, and marked weight             

      changes, Hematologic/Lymphatic: Negative for swollen nodes, abnormal bleeding, and          

      unusual bruising.                                                                           

10:33 : Positive for burning with urination, penile discharge, of the meatus.                   

                                                                                                  

Exam:                                                                                             

10:33 Constitutional:  This is a well developed, well nourished patient who is awake, alert,  malinda 

      and in no acute distress. Head/Face:  Normocephalic, atraumatic. Eyes:  Pupils equal        

      round and reactive to light, extra-ocular motions intact.  Lids and lashes normal.          

      Conjunctiva and sclera are non-icteric and not injected.  Cornea within normal limits.      

      Periorbital areas with no swelling, redness, or edema. ENT:  Nares patent. No nasal         

      discharge, no septal abnormalities noted.  Tympanic membranes are normal and external       

      auditory canals are clear.  Oropharynx with no redness, swelling, or masses, exudates,      

      or evidence of obstruction, uvula midline.  Mucous membranes moist. Neck:  Trachea          

      midline, no thyromegaly or masses palpated, and no cervical lymphadenopathy.  Supple,       

      full range of motion without nuchal rigidity, or vertebral point tenderness.  No            

      Meningismus. Chest/axilla:  Normal chest wall appearance and motion.  Nontender with no     

      deformity.  No lesions are appreciated. Cardiovascular:  Regular rate and rhythm with a     

      normal S1 and S2.  No gallops, murmurs, or rubs.  Normal PMI, no JVD.  No pulse             

      deficits. Respiratory:  Lungs have equal breath sounds bilaterally, clear to                

      auscultation and percussion.  No rales, rhonchi or wheezes noted.  No increased work of     

      breathing, no retractions or nasal flaring. Abdomen/GI:  Soft, non-tender, with normal      

      bowel sounds.  No distension or tympany.  No guarding or rebound.  No evidence of           

      tenderness throughout. Back:  No spinal tenderness.  No costovertebral tenderness.          

      Full range of motion. Skin:  Warm, dry with normal turgor.  Normal color with no            

      rashes, no lesions, and no evidence of cellulitis. MS/ Extremity:  Pulses equal, no         

      cyanosis.  Neurovascular intact.  Full, normal range of motion. Neuro:  Awake and           

      alert, GCS 15, oriented to person, place, time, and situation.  Cranial nerves II-XII       

      grossly intact.  Motor strength 5/5 in all extremities.  Sensory grossly intact.            

      Cerebellar exam normal.  Normal gait. Psych:  Awake, alert, with orientation to person,     

      place and time.  Behavior, mood, and affect are within normal limits.                       

10:33 : Male external genitalia: normal, no abrasion, penile discharge, purulent, Bladder:      

      is normal, Sexual behavior: the patient is sexually active, and reports multiple            

      partners.                                                                                   

                                                                                                  

Vital Signs:                                                                                      

10:36  / 89; Pulse 72; Resp 18; Temp 97.9; Pulse Ox 100% on R/A; Weight 88.45 kg;       ab2 

      Height 5 ft. 11 in. (180.34 cm); Pain 3/10;                                                 

10:55  / 86; Pulse 74; Resp 17; Pulse Ox 100% on R/A;                                   ab2 

10:36 Body Mass Index 27.20 (88.45 kg, 180.34 cm)                                             ab2 

                                                                                                  

MDM:                                                                                              

10:25 Patient medically screened.                                                             malinda 

10:36 Differential diagnosis: prostatitis, urethritis. Data reviewed: vital signs, nurses     malinda 

      notes. Data interpreted: Cardiac monitor: not applicable for this patient encounter.        

      rate is 63 beats/min, rhythm is regular, Pulse oximetry: on room air is 100 %.              

                                                                                                  

                                                                                             

10:38 Order name: Urine Dipstick-Ancillary                                                    EDMS

                                                                                             

10:20 Order name: Urine Dipstick-Ancillary (obtain specimen); Complete Time: 10:35            malinda 

                                                                                                  

Administered Medications:                                                                         

10:35 Drug: Doxycycline 200 mg Route: PO;                                                     ab2 

10:36 Drug: Rocephin (cefTRIAXone) 1 grams Route: IM; Site: left vastus lateralis;            ab2 

10:36 Drug: Zithromax (azithromycin) 1 grams Route: PO;                                       ab2 

                                                                                                  

                                                                                                  

Disposition Summary:                                                                              

22 10:37                                                                                    

Discharge Ordered                                                                                 

      Location: Home                                                                          [facility] 

      Problem: new                                                                            [facility] 

      Symptoms: have improved                                                                 [facility] 

      Condition: Stable                                                                       [facility] 

      Diagnosis                                                                                   

        - Other urethritis - gc                                                               malinda 

        - Dysuria                                                                             malinda 

      Followup:                                                                               malinda 

        - With: Private Physician                                                                  

        - When: 2 - 3 days                                                                         

        - Reason: Recheck today's complaints, Continuance of care, Re-evaluation by your           

      physician                                                                                   

      Discharge Instructions:                                                                     

        - Discharge Summary Sheet                                                             malinda 

        - Dysuria                                                                             malinda 

        - Urethritis, Adult                                                                   [facility] 

        - Preventing Sexually Transmitted Infections, Adult                                   [facility] 

      Forms:                                                                                      

        - Medication Reconciliation Form                                                      [facility] 

        - Thank You Letter                                                                    malinda 

        - Antibiotic Education                                                                malinda 

        - Prescription Opioid Use                                                             [facility] 

      Prescriptions:                                                                              

        - Doxycycline Hyclate 100 mg Oral Tablet                                                   

            - take 1 tablet by ORAL route every 12 hours; 20 tablet; Refills: 0, Product      malinda 

      Selection Permitted                                                                         

Signatures:                                                                                       

Mervin Duarte MD MD cha Bleininger, Alexis ab2                                                  

                                                                                                  

Corrections: (The following items were deleted from the chart)                                    

10:24 10:20 Urine Pregnancy Test ordered. malinda saucedo 

                                                                                                  

**************************************************************************************************

## 2022-09-07 ENCOUNTER — HOSPITAL ENCOUNTER (EMERGENCY)
Dept: HOSPITAL 97 - ER | Age: 32
Discharge: HOME | End: 2022-09-07
Payer: SELF-PAY

## 2022-09-07 VITALS — TEMPERATURE: 98 F | SYSTOLIC BLOOD PRESSURE: 138 MMHG | DIASTOLIC BLOOD PRESSURE: 91 MMHG | OXYGEN SATURATION: 100 %

## 2022-09-07 DIAGNOSIS — R30.0: Primary | ICD-10-CM

## 2022-09-07 PROCEDURE — 99283 EMERGENCY DEPT VISIT LOW MDM: CPT

## 2022-09-07 PROCEDURE — 96372 THER/PROPH/DIAG INJ SC/IM: CPT

## 2022-09-07 NOTE — EDPHYS
Physician Documentation                                                                           

 [facility]                                                                 

Name: Theron Dowell                                                                           

Age: 31 yrs                                                                                       

Sex: Male                                                                                         

: 1990                                                                                   

MRN: [id number]                                                                                   

Arrival Date: 2022                                                                          

Time: 09:21                                                                                       

Account#: [id number]                                                                            

Bed Waiting                                                                                       

Private MD:                                                                                       

ED Physician Efraín Seo                                                                         

HPI:                                                                                              

                                                                                             

09:40 This 31 yrs old Black Male presents to ER via Ambulatory with complaints of STD         jmm 

      Exposure.                                                                                   

09:40 Onset: The symptoms/episode began/occurred 1 day(s) ago. Modifying factors: The         jmm 

      symptoms are alleviated by nothing, the symptoms are aggravated by urinating.               

      Associated signs and symptoms: Pertinent positives: dysuria. It is unknown whether or       

      not the patient has had similar symptoms in the past. Patient states his partner was        

      recently diagnosed with gonorrhea. Complains of dysuria. Denies abdominal pain,             

      vomiting. .                                                                                 

                                                                                                  

Historical:                                                                                       

- Allergies:                                                                                      

09:39 No Known Allergies;                                                                     bm7 

- Home Meds:                                                                                      

09:39 None [Active];                                                                          bm7 

- PMHx:                                                                                           

09:39 None;                                                                                   bm7 

- PSHx:                                                                                           

09:39 None;                                                                                   bm7 

                                                                                                  

- Immunization history:: Adult Immunizations up to date, Client reports having NOT                

  received the Covid vaccine.                                                                     

- Social history:: Smoking status: Patient denies any tobacco usage or history of.                

  Patient uses alcohol, occasionally.                                                             

                                                                                                  

                                                                                                  

ROS:                                                                                              

09:40 Constitutional: Negative for fever, chills, and weight loss, Cardiovascular: Negative   jmm 

      for chest pain, palpitations, and edema, Respiratory: Negative for shortness of breath,     

      cough, wheezing, and pleuritic chest pain.                                                  

09:40 : Positive for urinary symptoms.                                                          

09:40 All other systems are negative.                                                             

                                                                                                  

Exam:                                                                                             

09:40 Constitutional:  This is a well developed, well nourished patient who is awake, alert,  jmm 

      and in no acute distress. Head/Face:  atraumatic. Eyes:  EOMI, no conjunctival erythema     

      appreciated ENT:  Moist Mucus Membranes Neck:  Trachea midline, Supple Chest/axilla:        

      Normal chest wall appearance and motion.   Cardiovascular:  Regular rate and rhythm.        

      No edema appreciated Respiratory:  Normal respirations, no respiratory distress             

      appreciated Abdomen/GI:  Non distended Skin:  General appearance color normal MS/           

      Extremity:  Moves all extremities, no obvious deformities appreciated, no edema noted       

      to the lower extremities  Neuro:  Awake and alert Psych:  Behavior is normal, Mood is       

      normal, Patient is cooperative and pleasant                                                 

                                                                                                  

Vital Signs:                                                                                      

09:37  / 91; Pulse 79; Resp 16; Temp 98.0(TE); Pulse Ox 100% on R/A; Weight 86.18 kg;   bm7 

      Height 5 ft. 10 in. (177.80 cm); Pain 5/10;                                                 

09:37 Body Mass Index 27.26 (86.18 kg, 177.80 cm)                                             bm7 

                                                                                                  

MDM:                                                                                              

09:39 Patient medically screened.                                                             [facility] 

09:41 Data reviewed: vital signs, nurses notes. Counseling: I had a detailed discussion with  blake 

      the patient and/or guardian regarding: the historical points, exam findings, and any        

      diagnostic results supporting the discharge/admit diagnosis, the need for outpatient        

      follow up, to return to the emergency department if symptoms worsen or persist or if        

      there are any questions or concerns that arise at home. ED course: Will treat for sti.      

      patient is advised to follow up with pcp and otherwise given strict return precautions.     

      patient understood and agrees with the plan of care. .                                      

                                                                                                  

Administered Medications:                                                                         

09:51 Drug: AZITHromycin 1 grams Route: PO;                                                   bm7 

09:54 Follow up: Response: No adverse reaction                                                bm7 

09:52 Drug: Rocephin (cefTRIAXone) 500 mg Route: IM; Site: right gluteus;                     bm7 

09:54 Follow up: Response: No adverse reaction                                                bm7 

                                                                                                  

                                                                                                  

Disposition:                                                                                      

22:42 Co-signature as Attending Physician, Efraín Seo DO I agree with the assessment and     ms3 

      plan of care.                                                                               

                                                                                                  

Disposition Summary:                                                                              

22 09:42                                                                                    

Discharge Ordered                                                                                 

      Location: Home                                                                          [facility] 

      Condition: Stable                                                                       [facility] 

      Diagnosis                                                                                   

        - Dysuria                                                                             [facility] 

      Followup:                                                                               [facility] 

        - With: Private Physician                                                                  

        - When: 2 - 3 days                                                                         

        - Reason: Recheck today's complaints, Continuance of care, Re-evaluation by your           

      physician                                                                                   

      Discharge Instructions:                                                                     

        - Discharge Summary Sheet                                                             [facility] 

        - Dysuria                                                                             [facility] 

      Forms:                                                                                      

        - Medication Reconciliation Form                                                      [facility] 

        - Thank You Letter                                                                    [facility] 

        - Antibiotic Education                                                                [facility] 

        - Prescription Opioid Use                                                             [facility] 

Signatures:                                                                                       

Christian Son PA PA jmm Sims, Marcus, DO DO   ms3                                                  

Viviana Nelson, RN                  RN   bm7                                                  

                                                                                                  

**************************************************************************************************

## 2022-09-07 NOTE — XMS REPORT
Continuity of Care Document

                          Created on:2022



Patient:MARIA M CONTE

Sex:Male

:1990

External Reference #:[id number]





Demographics







                          Address                   [address] [place] [address]

 

                          Home Phone                [phone]

 

                          Mobile Phone              [phone]

 

                          Email Address             [email or web address]

 

                          Preferred Language        English

 

                          Marital Status            Unknown

 

                          U[place] Affiliation     Unknown

 

                          Race                      Unknown

 

                          Additional Race(s)        Black or 

 

                          Ethnic Group              Not  or 









Author







                          Organization              [facility]

t

 

                          Address                   [address]



                                                    [place] [address]

 

                          Phone                     [phone]









Support







                Name            Relationship    Address         Phone

 

                Contact, No     Other           Unavailable     [phone]

 

                Avani Conte  Mother          Unavailable     [phone]









Care Team Providers







                    Name                Role                Phone

 

                    Pcp, Patient Does Not Have A Primary Care Physician [phone]



 

                    VERO GOTTI Attending Clinician Unavailable

 

                    Vero Barlow Attending Clinician [phone]

 

                    Gail Saleh DO Attending Clinician [phone]

 

                    GAIL SALEH  Attending Clinician Unavailable

 

                    Moira Lizarraga  Attending Clinician [phone]

 

                    MOIRA ALCANTARA      Attending Clinician Unavailable









Problems







       Condition Condition Condition Status Onset  Resolution Last   Treating Co

mments 

Source



       Name   Details Category        Date   Date   Treatment Clinician        



                                                 Date                 

 

       No known No known Disease                                           Unive

rs



       active active                                                  ity of



       problems problems                                                  [facility]







Allergies, Adverse Reactions, Alerts







       Allergy Allergy Status Severity Reaction(s) Onset  Inactive Treating Comm

ents 

Source



       Name   Type                        Date   Date   Clinician        

 

       NO KNOWN Drug   Active                                           [facility]



       ALLERGIE Class                                                   ity of



       S                                                              [facility]







Social History







           Social Habit Start Date Stop Date  Quantity   Comments   Source

 

           Exposure to 2022 Not sure              [facility]

 [place]



           SARS-CoV-2 (event) 00:00:00   17:38:00                         Medica

l Branch

 

           Sex Assigned At 1990                       [facility] [place]



           Birth      00:00:00   00:00:00                         Medical Branch









                Smoking Status  Start Date      Stop Date       Source

 

                Tobacco smoking consumption                                 Univ

[facility]



                unknown                                         Branch







Medications







       Ordered Filled Start  Stop   Current Ordering Indication Dosage Frequency

 Signature

                    Comments            Components          Source



     Medication Medication Date Date Medication? Clinician                (SIG) 

          



     Name Name                                                   

 

     cefTRIAXone      2022- No             500mg      500 mg,           U

nivers



     (ROCEPHIN)                                Intramuscu           it

y of



     injection      00:30: 00:11                          lar, ONCE,           T

exas



     500 mg      00   :00                           1 dose, On           Medical



                                                  Wed            Branch



                                                  22 at           



                                                  1930,           



                                                  ASAP<br>Re           



                                                  ason for           



                                                  Anti-Infec           



                                                  tive:           



                                                  Empiric           



                                                  Therapy           



                                                  for            



                                                  Suspected           



                                                  Infection<           



                                                  br>Empiric           



                                                  Therapy           



                                                  Site:           



                                                  Urine<br>D           



                                                  uration of           



                                                  therapy: 7           



                                                  days           

 

     metroNIDAZO      2022- No             500mg      500 mg,           U

nivers



     LE (FLAGYL)                                Oral,           ity of



     tablet 500      23:30: 00:11                          ONCE, 1           Eric

as



     mg        00   :00                           dose, On           Medical



                                                  Wed            Branch



                                                  22 at           



                                                  1830,           



                                                  ASAP<br>Re           



                                                  ason for           



                                                  Anti-Infec           



                                                  tive:           



                                                  Documented           



                                                  Infection<           



                                                  br>Documen           



                                                  lily            



                                                  Infection           



                                                  Site:           



                                                  Urine<br>D           



                                                  uration of           



                                                  Therapy: 7           



                                                  days           

 

     doxycycline      2022- No             100mg      100 mg,           U

nivers



     hyclate                                Oral,           ity of



     (Vibramycin      23:30: 00:11                          ONCE, 1           Te

x[place]



     ) capsule      00   :00                           dose, On           Medica

l



     100 mg                                         Wed            Branch



                                                  22 at           



                                                  1830,           



                                                  ASAP<br>Re           



                                                  ason for           



                                                  Anti-Infec           



                                                  tive:           



                                                  Empiric           



                                                  Therapy           



                                                  for            



                                                  Suspected           



                                                  Infection<           



                                                  br>Empiric           



                                                  Therapy           



                                                  Site:           



                                                  Urine<br>D           



                                                  uration of           



                                                  therapy: 7           



                                                  days           

 

     doxycycline      2022- Yes       [id number] 100mg      Take 1          

 Univers



     hyclate 100                                capsule by           i

ty of



     mg capsule      00:00: 04:59                          mouth in           Te

[place]



               00   :00                           the            Medical



                                                  morning           Branch



                                                  and 1           



                                                  capsule in           



                                                  the            



                                                  evening.           



                                                  Do all           



                                                  this for 7           



                                                  days.           

 

     metroNIDAZO      2022- Yes       [id number] 500mg      Take 1          

 Univers



      mg                                tablet by           ity 

of



     tablet      00:00: 04:59                          mouth in           [place]



               00   :00                           the            Medical



                                                  morning           Branch



                                                  and 1           



                                                  tablet in           



                                                  the            



                                                  evening.           



                                                  Do all           



                                                  this for 7           



                                                  days.           

 

     cefTRIAXone            Yes            500mg      500 mg,           Un

shira



     (ROCEPHIN)      2-16                               Intramuscu           ity

 of



     injection      22:00:                               lar, Q24H,           Te

xas



     500 mg      00                                 First dose           Medical



                                                  on Wed           Branch



                                                  22 at           



                                                  1600,           



                                                  Until           



                                                  Discontinu           



                                                  ed,            



                                                  ASAP<br>Re           



                                                  ason for           



                                                  Anti-Infec           



                                                  tive:           



                                                  Empiric           



                                                  Therapy           



                                                  for            



                                                  Suspected           



                                                  Infection<           



                                                  br>Empiric           



                                                  Therapy           



                                                  Site:           



                                                  Pelvic<br>           



                                                  Duration           



                                                  of             



                                                  therapy:           



                                                  72 hours           

 

     azithromyci       No             1000mg      1,000 mg,         

  Univers



     n                                   Oral,           ity of



     (ZITHROMAX)      22:00: 21:04                          ONCE, 1           Te

xas



     tablet      00   :00                           dose, On           Medical



     1,000 mg                                         Wed            Branch



                                                  22 at           



                                                  1600,           



                                                  ASAP<br>Re           



                                                  ason for           



                                                  Anti-Infec           



                                                  tive:           



                                                  Empiric           



                                                  Therapy           



                                                  for            



                                                  Suspected           



                                                  Infection<           



                                                  br>Empiric           



                                                  Therapy           



                                                  Site:           



                                                  Pelvic<br>           



                                                  Duration           



                                                  of             



                                                  therapy:           



                                                  72 hours           

 

     No known            No                                      Univers



     medications                                                    ity of



               14:50:                                              [address]







Vital Signs







             Vital Name   Observation Time Observation Value Comments     Source

 

             Systolic blood 2022 22:36:00 140 mm[Hg]                Univer

sity of



             [facility]

 

             Diastolic blood 2022 22:36:00 87 mm[Hg]                 [facility]

 

             Heart rate   2022 22:36:00 92 /min                   [facility]

 

             Body temperature 2022 22:36:00 36.61 Gilda                 [facility]

 

             Respiratory rate 2022 22:36:00 14 /min                   [facility]

 

             Body height  2022 22:36:00 180.3 cm                  [facility]

 

             Body weight  2022 22:36:00 86.183 kg                 [facility]

 

             BMI          2022 22:36:00 26.50 kg/m2               [facility]

 

             Oxygen saturation in 2022 22:36:00 99 /min                   

[facility]



             Arterial blood by                                        [place] [facility]

kirsten



             Pulse oximetry                                        Branch

 

             Systolic blood 2022 20:49:00 153 mm[Hg]                Univer

sity of



             [facility]

 

             Diastolic blood 2022 20:49:00 86 mm[Hg]                 [facility]

[facility]

 

             Heart rate   2022 20:49:00 81 /min                   [facility]

 

             Body temperature 2022 20:49:00 37.22 Gilda                 [facility]

 

             Respiratory rate 2022 20:49:00 16 /min                   [facility]

 

             Body height  2022 20:49:00 180.3 cm                  [facility]

 

             Body weight  2022 20:49:00 90.719 kg                 [facility]

 

             BMI          2022 20:49:00 27.89 kg/m2               [facility]

 

             Oxygen saturation in 2022 20:49:00 98 /min                   

[facility]



             Arterial blood by                                        [place] [facility]

kirsten



             Pulse oximetry                                        [place]







Procedures







                Procedure       Date / Time Performed Performing Clinician Sourc

e

 

                URINALYSIS      2022 22:43:00 Gail Saleh [facility]

 

                CONSENT/REFUSAL FOR 2022 22:25:00 [facility]igned, [facility]



                DIAGNOSIS AND                   Name            Medical Branch



                TREATMENT                                       

 

                NOTICE OF PRIVACY 2022 20:48:28 Doctor Unassigned, No [facility]

[facility] [place]



                PRACTICES                       Name            Medical Branch

 

                CONSENT/REFUSAL FOR 2022 20:45:33 Doctor Unassigned, No [facility]



                DIAGNOSIS AND                   Name            Medical Branch



                TREATMENT                                       







Encounters







        Start   End     Encounter Admission Attending Care    Care    Encounter 

Source



        Date/Time Date/Time Type    Type    Clinicians Facility Department ID   

   

 

        2022 Emergency X       ANA MARÍA [facility]    ERT     [id number]

[id number] Univers



        17:43:00 19:52:00                 VERO bowens [facility]

 

        2022 Emergency         Ana María [facility]    [id number]

[id number] [facility]



        17:43:00 19:52:00                 Vero ANDRE [id number]        

 gogo [facility] [id number]         [facility]  [phone]         Medi

kirsten



                                                        [address]             Branch

 

        2022 Emergency         Delfino [facility]    [id number]

[id number] Univers



        14:52:00 15:43:00                 Gail ANDRE [id number]         

gogo [facility] [id number]         [facility]  [phone]         [address]

 

        2022 Emergency X       DELFINO [facility]    ERT     [id number]

[id number] Univers



        14:52:00 15:43:00                 GAIL bowens [facility]

 

        2020 [facility]         [facility]    [id number]

[id number] 



        14:10:58 15:32:00                 Moira Andre [id number]         



                                                [place] [id number]         



                                                [place]  [phone]         



                                                        [facility]             

 

        2020 Emergency [facility]     [id number]

[id number] Univers



        14:10:58 14:10:58                 MOIRA bowens [facility]







Results

This patient has no known results.

## 2022-09-07 NOTE — ER
Nurse's Notes                                                                                     

 [facility]                                                                 

Name: Theron Dowell                                                                           

Age: 31 yrs                                                                                       

Sex: Male                                                                                         

: 1990                                                                                   

MRN: [id number]                                                                                   

Arrival Date: 2022                                                                          

Time: 09:21                                                                                       

Account#: [id number]                                                                            

Bed Waiting                                                                                       

Private MD:                                                                                       

Diagnosis: Dysuria                                                                                

                                                                                                  

Presentation:                                                                                     

                                                                                             

09:39 Chief complaint: Patient states: I think I have gonorrhea. Someone that I was with      bm7 

      tested positive and now I am having problems peeing. Coronavirus screen: At this time,      

      the client does not indicate any symptoms associated with coronavirus-19. Ebola Screen:     

      No symptoms or risks identified at this time. Initial Sepsis Screen: Does the patient       

      meet any 2 criteria? No. Patient's initial sepsis screen is negative. Does the patient      

      have a suspected source of infection? No. Patient's initial sepsis screen is negative.      

      Risk Assessment: Do you want to hurt yourself or someone else? Patient reports no           

      desire to harm self or others. Onset of symptoms was 2022. Care prior to      

      arrival: None.                                                                              

09:39 Method Of Arrival: Ambulatory                                                           [facility] 

09:39 Acuity: YG 4                                                                           bm7 

                                                                                                  

Triage Assessment:                                                                                

09:39 General: Appears in no apparent distress. uncomfortable, Behavior is calm, cooperative, bm7 

      appropriate for age. Pain: Complains of pain in pelvis. EENT: No deficits noted. No         

      signs and/or symptoms were reported regarding the EENT system. Neuro: No deficits           

      noted. Cardiovascular: No deficits noted. Respiratory: No deficits noted. GI: No            

      deficits noted. No signs and/or symptoms were reported involving the gastrointestinal       

      system. : Reports burning with urination, discharge, from penis that is malodorous.       

      : Derm: No deficits noted. No signs and/or symptoms reported regarding the                

      dermatologic system. Musculoskeletal: No deficits noted. No signs and/or symptoms           

      reported regarding the musculoskeletal system.                                              

                                                                                                  

Historical:                                                                                       

- Allergies:                                                                                      

09:39 No Known Allergies;                                                                     bm7 

- Home Meds:                                                                                      

09:39 None [Active];                                                                          bm7 

- PMHx:                                                                                           

09:39 None;                                                                                   bm7 

- PSHx:                                                                                           

09:39 None;                                                                                   bm7 

                                                                                                  

- Immunization history:: Adult Immunizations up to date, Client reports having NOT                

  received the Covid vaccine.                                                                     

- Social history:: Smoking status: Patient denies any tobacco usage or history of.                

  Patient uses alcohol, occasionally.                                                             

                                                                                                  

                                                                                                  

Screenin:53 Abuse screen: Denies threats or abuse. Nutritional screening: No deficits noted.        bm7 

      Tuberculosis screening: No symptoms or risk factors identified. Fall Risk None              

      identified.                                                                                 

                                                                                                  

Assessment:                                                                                       

09:53 Reassessment: No changes from previously documented assessment.                         bm7 

                                                                                                  

Vital Signs:                                                                                      

09:37  / 91; Pulse 79; Resp 16; Temp 98.0(TE); Pulse Ox 100% on R/A; Weight 86.18 kg;   bm7 

      Height 5 ft. 10 in. (177.80 cm); Pain 5/10;                                                 

09:37 Body Mass Index 27.26 (86.18 kg, 177.80 cm)                                             bm7 

                                                                                                  

ED Course:                                                                                        

09:21 Patient arrived in ED.                                                                  am2 

09:22 Christian Son PA is PHCP.                                                              [facility] 

09:22 Efraín Seo DO is Attending Physician.                                                jmm 

09:37 Arm band placed on right wrist.                                                         bm7 

09:39 Triage completed.                                                                       bm7 

09:53 Patient has correct armband on for positive identification.                             bm7 

09:53 No provider procedures requiring assistance completed. Patient did not have IV access   bm7 

      during this emergency room visit.                                                           

                                                                                                  

Administered Medications:                                                                         

09:51 Drug: AZITHromycin 1 grams Route: PO;                                                   bm7 

09:54 Follow up: Response: No adverse reaction                                                bm7 

09:52 Drug: Rocephin (cefTRIAXone) 500 mg Route: IM; Site: right gluteus;                     bm7 

09:54 Follow up: Response: No adverse reaction                                                bm7 

                                                                                                  

                                                                                                  

Medication:                                                                                       

09:53 VIS not applicable for this client.                                                     bm7 

                                                                                                  

Outcome:                                                                                          

09:42 Discharge ordered by MD.                                                                [facility] 

09:53 Discharged to home ambulatory.                                                          bm7 

09:53 Condition: good                                                                             

09:53 Discharge instructions given to patient, Instructed on discharge instructions,              

      Demonstrated understanding of instructions.                                                 

09:54 Patient left the ED.                                                                    bm7 

                                                                                                  

Signatures:                                                                                       

Christian Son PA PA jmm Moreno, Amanda                               am2                                                  

Viviana Nelson, RN                  RN   bm7                                                  

                                                                                                  

**************************************************************************************************

## 2023-08-17 ENCOUNTER — HOSPITAL ENCOUNTER (EMERGENCY)
Dept: HOSPITAL 97 - ER | Age: 33
Discharge: HOME | End: 2023-08-17
Payer: SELF-PAY

## 2023-08-17 VITALS — SYSTOLIC BLOOD PRESSURE: 136 MMHG | OXYGEN SATURATION: 98 % | DIASTOLIC BLOOD PRESSURE: 89 MMHG

## 2023-08-17 VITALS — TEMPERATURE: 98 F

## 2023-08-17 DIAGNOSIS — N34.1: Primary | ICD-10-CM

## 2023-08-17 PROCEDURE — 81001 URINALYSIS AUTO W/SCOPE: CPT

## 2023-08-17 PROCEDURE — 99284 EMERGENCY DEPT VISIT MOD MDM: CPT

## 2023-08-17 PROCEDURE — 96372 THER/PROPH/DIAG INJ SC/IM: CPT

## 2023-08-17 NOTE — EDPHYS
Physician Documentation                                                                           

 [facility]                                                                 

Name: Theron Dowell                                                                           

Age: 32 yrs                                                                                       

Sex: Male                                                                                         

: 1990                                                                                   

MRN: [id number]                                                                                   

Arrival Date: 2023                                                                          

Time: 14:51                                                                                       

Account#: [id number]                                                                            

Bed 7                                                                                             

Private MD:                                                                                       

ED Physician Mervin Duarte                                                                      

HPI:                                                                                              

                                                                                             

15:35 This 32 yrs old Black Male presents to ER via Ambulatory with complaints of STD         malinda 

      Exposure.                                                                                   

15:35 The patient presents with urinary symptoms, dysuria. Onset: The symptoms/episode        malinda 

      began/occurred 2 day(s) ago. Modifying factors: The symptoms are alleviated by nothing.     

      Associated signs and symptoms: The patient has no apparent associated signs or              

      symptoms. Severity of symptoms: At their worst the symptoms were moderate, in the           

      emergency department the symptoms are unchanged. The patient has experienced similar        

      episodes in the past, a few times.                                                          

                                                                                                  

Historical:                                                                                       

- Allergies:                                                                                      

15:50 No Known Allergies;                                                                     ap3 

- Home Meds:                                                                                      

15:50 None [Active];                                                                          ap3 

- PMHx:                                                                                           

15:50 None;                                                                                   ap3 

                                                                                                  

- Social history:: Smoking status: unknown.                                                       

- Family history:: not pertinent.                                                                 

                                                                                                  

                                                                                                  

ROS:                                                                                              

15:36 Constitutional: Negative for fever, chills, and weight loss, Eyes: Negative for injury, malinda 

      pain, redness, and discharge, ENT: Negative for injury, pain, and discharge, Neck:          

      Negative for injury, pain, and swelling, Cardiovascular: Negative for chest pain,           

      palpitations, and edema, Respiratory: Negative for shortness of breath, cough,              

      wheezing, and pleuritic chest pain, Abdomen/GI: Negative for abdominal pain, nausea,        

      vomiting, diarrhea, and constipation, Back: Negative for injury and pain, MS/Extremity:     

      Negative for injury and deformity, Skin: Negative for injury, rash, and discoloration,      

      Neuro: Negative for headache, weakness, numbness, tingling, and seizure, Psych:             

      Negative for depression, anxiety, suicide ideation, homicidal ideation, and                 

      hallucinations, Allergy/Immunology: Negative for hives, rash, and allergies, Endocrine:     

      Negative for neck swelling, polydipsia, polyuria, polyphagia, and marked weight             

      changes, Hematologic/Lymphatic: Negative for swollen nodes, abnormal bleeding, and          

      unusual bruising.                                                                           

15:36 : Positive for burning with urination, penile discharge.                                  

                                                                                                  

Exam:                                                                                             

15:36 Constitutional:  This is a well developed, well nourished patient who is awake, alert,  malinda 

      and in no acute distress. Head/Face:  Normocephalic, atraumatic. Eyes:  Pupils equal        

      round and reactive to light, extra-ocular motions intact.  Lids and lashes normal.          

      Conjunctiva and sclera are non-icteric and not injected.  Cornea within normal limits.      

      Periorbital areas with no swelling, redness, or edema. ENT:  Nares patent. No nasal         

      discharge, no septal abnormalities noted.  Tympanic membranes are normal and external       

      auditory canals are clear.  Oropharynx with no redness, swelling, or masses, exudates,      

      or evidence of obstruction, uvula midline.  Mucous membranes moist. Neck:  Trachea          

      midline, no thyromegaly or masses palpated, and no cervical lymphadenopathy.  Supple,       

      full range of motion without nuchal rigidity, or vertebral point tenderness.  No            

      Meningismus. Chest/axilla:  Normal chest wall appearance and motion.  Nontender with no     

      deformity.  No lesions are appreciated. Cardiovascular:  Regular rate and rhythm with a     

      normal S1 and S2.  No gallops, murmurs, or rubs.  Normal PMI, no JVD.  No pulse             

      deficits. Respiratory:  Lungs have equal breath sounds bilaterally, clear to                

      auscultation and percussion.  No rales, rhonchi or wheezes noted.  No increased work of     

      breathing, no retractions or nasal flaring. Abdomen/GI:  Soft, non-tender, with normal      

      bowel sounds.  No distension or tympany.  No guarding or rebound.  No evidence of           

      tenderness throughout. Back:  No spinal tenderness.  No costovertebral tenderness.          

      Full range of motion. Skin:  Warm, dry with normal turgor.  Normal color with no            

      rashes, no lesions, and no evidence of cellulitis. MS/ Extremity:  Pulses equal, no         

      cyanosis.  Neurovascular intact.  Full, normal range of motion. Neuro:  Awake and           

      alert, GCS 15, oriented to person, place, time, and situation.  Cranial nerves II-XII       

      grossly intact.  Motor strength 5/5 in all extremities.  Sensory grossly intact.            

      Cerebellar exam normal.  Normal gait. Psych:  Awake, alert, with orientation to person,     

      place and time.  Behavior, mood, and affect are within normal limits.                       

15:36 : CVA tenderness, is absent, Male external genitalia: normal, Bladder: is normal,         

      Sexual behavior: the patient is sexually active, unk.                                       

                                                                                                  

Vital Signs:                                                                                      

15:12  / 94; Pulse 85; Resp 16; Temp 98; Pulse Ox 97% ; Weight 90.72 kg; Height 5 ft.   cm10

      11 in. ;                                                                                    

15:55  / 89; Pulse 81; Resp 18; Pulse Ox 98% on R/A;                                    ld1 

15:12 Body Mass Index 27.89 (90.72 kg, 180.34 cm)                                             cm10

                                                                                                  

MDM:                                                                                              

14:57 Patient medically screened.                                                             [facility] 

15:38 Differential diagnosis: UTI, prostatitis, urethritis. Data reviewed: vital signs,       [facility] 

      nurses notes. Consideration of Admission/Observation Escalation of care including           

      admission/observation considered. I considered the following discharge prescriptions or     

      medication management in the emergency department Medications were administered in the      

      Emergency Department. See MAR. Historians other than the Patient: none. Care                

      significantly affected by the following chronic conditions: hx of std. Counseling: I        

      had a detailed discussion with the patient and/or guardian regarding the historical         

      points, exam findings, and any diagnostic results supporting the discharge/admit            

      diagnosis, the need for outpatient follow up.                                               

                                                                                                  

                                                                                             

15:01 Order name: Urinalysis w/ reflexes                                                      malinda 

                                                                                                  

Administered Medications:                                                                         

15:32 CANCELLED (Duplicate Order): NS 0.9% IV 1000 ml IV at 1 bolus Per protocol; 1000 mL     malinda 

      bolus                                                                                       

15:54 Drug: AZITHromycin PO 1 grams Route: PO;                                                ld1 

15:54 Drug: Doxycycline  mg Route: PO;                                                  ld1 

15:55 Drug: Rocephin (cefTRIAXone) IM 1 grams Route: IM; Site: left deltoid;                  ld1 

                                                                                                  

                                                                                                  

Disposition Summary:                                                                              

23 15:41                                                                                    

Discharge Ordered                                                                                 

      Location: Home                                                                          [facility] 

      Problem: new                                                                            [facility] 

      Symptoms: have improved                                                                 [facility] 

      Condition: Stable                                                                       [facility] 

      Diagnosis                                                                                   

        - Nonspecific urethritis                                                              malinda 

        - Dysuria                                                                             [facility] 

      Followup:                                                                               [facility] 

        - With: Private Physician                                                                  

        - When: 2 - 3 days                                                                         

        - Reason: Recheck today's complaints, Continuance of care, Re-evaluation by your           

      physician                                                                                   

      Discharge Instructions:                                                                     

        - Discharge Summary Sheet                                                             malinda 

        - Dysuria                                                                             malinda 

        - Urethritis, Adult                                                                   [facility] 

        - Preventing Sexually Transmitted Infections, Adult                                   [facility] 

      Forms:                                                                                      

        - Medication Reconciliation Form                                                      [facility] 

        - Thank You Letter                                                                    [facility] 

        - Antibiotic Education                                                                malinda 

        - Prescription Opioid Use                                                             malinda 

        - Patient Portal Instructions                                                         [facility] 

        - Leadership Thank You Letter                                                         [facility] 

      Prescriptions:                                                                              

        - Doxycycline Hyclate 100 mg Oral Tablet                                                   

            - take 1 tablet by ORAL route every 12 hours; 20 tablet; Refills: 0, Product      [facility] 

      Selection Permitted                                                                         

        - Cipro 500 mg Oral Tablet                                                                 

            - take 1 tablet by ORAL route every 12 hours for 7 days; 14 tablet; Refills: 0,   [facility] 

      Product Selection Permitted                                                                 

Signatures:                                                                                       

Dispatcher [employer]                           Mervin Daigle MD MD cha Prokisch, Amanda RN                    RN   ap3                                                  

Linnea Seo RN                        RN   ld1                                                  

                                                                                                  

Corrections: (The following items were deleted from the chart)                                    

15:32 15:01 NS 0.9% IV 1000 ml IV at 1 bolus Per protocol; 1000 mL bolus ordered. [facility] 

15:32 15:01 Labs collected and sent ordered. [facility] 

15:38 15:01 IV Saline Lock ordered. [facility]                                                       ld1 

                                                                                                  

**************************************************************************************************

## 2023-08-17 NOTE — XMS REPORT
Continuity of Care Document

                           Created on:2023



Patient:MARIA M CONTE

Sex:Male

:1990

External Reference #:[id number]





Demographics







                          Address                   [address] [place] [address]

 

                          Home Phone                [phone]

 

                          Mobile Phone              [phone]

 

                          Email Address             [email or web address]

 

                          Preferred Language        English

 

                          Marital Status            Unknown

 

                          Mandaen Affiliation     Unknown

 

                          Race                      Unknown

 

                          Additional Race(s)        Black or 

 

                          Ethnic Group              Not  or 









Author







                          Organization              [facility]

t

 

                          Address                   [address] [place] [address] [address] [place] [id number]

 

                          Phone                     [phone]









Support







                Name            Relationship    Address         Phone

 

                Contact, No     Other           Unavailable     [phone]

 

                Avani Conte  Mother          Unavailable     [phone]









Care Team Providers







                    Name                Role                Phone

 

                    Pcp, Patient Does Not Have A Primary Care Physician [phone]



 

                    VERO GOTTI Attending Clinician Unavailable

 

                    Vero Barlow Attending Clinician [phone]

 

                    Gail Saleh DO Attending Clinician [phone]

 

                    GAIL SALEH  Attending Clinician Unavailable

 

                    Moira Lizarraga  Attending Clinician [phone]

 

                    MOIRA ALCANTARA      Attending Clinician Unavailable









Problems







       Condition Condition Condition Status Onset  Resolution Last   Treating Co

mments 

Source



       Name   Details Category        Date   Date   Treatment Clinician        



                                                 Date                 

 

       No known No known Disease                                           Unive

rs



       active active                                                  ity of



       problems problems                                                  [facility]







Allergies, Adverse Reactions, Alerts







       Allergy Allergy Status Severity Reaction(s) Onset  Inactive Treating Comm

ents 

Source



       Name   Type                        Date   Date   Clinician        

 

       NO KNOWN Drug   Active                                           [facility]



       ALLERGIE Class                                                   ity of



       S                                                              [facility]







Social History







           Social Habit Start Date Stop Date  Quantity   Comments   Source

 

           Exposure to 2022 Not sure              [facility]

 [place]



           SARS-CoV-2 (event) 00:00:00   17:38:00                         Medica

l Branch

 

           Sex Assigned At 1990                       [facility]



           Birth      00:00:00   00:00:00                         Medical Branch









                Smoking Status  Start Date      Stop Date       Source

 

                Tobacco smoking consumption                                 Univ

[facility]



                unknown                                         Branch







Medications







       Ordered Filled Start  Stop   Current Ordering Indication Dosage Frequency

 Signature

                    Comments            Components          Source



     Medication Medication Date Date Medication? Clinician                (SIG) 

          



     Name Name                                                   

 

     cefTRIAXone      2022- No             500mg      500 mg,           U

nivers



     (ROCEPHIN)                                Intramuscu           it

y of



     injection      00:30: 00:11                          lar, ONCE,           T

[place]as



     500 mg      00   :00                           1 dose, On           Medical



                                                  Wed            Branch



                                                  22 at           



                                                  1930,           



                                                  ASAP<br>Re           



                                                  ason for           



                                                  Anti-Infec           



                                                  tive:           



                                                  Empiric           



                                                  Therapy           



                                                  for            



                                                  Suspected           



                                                  Infection<           



                                                  br>Empiric           



                                                  Therapy           



                                                  Site:           



                                                  Urine<br>D           



                                                  uration of           



                                                  therapy: 7           



                                                  days           

 

     metroNIDAZO      2022- No             500mg      500 mg,           U

nivers



     LE (FLAGYL)                                Oral,           ity of



     tablet 500      23:30: 00:11                          ONCE, 1           Eric

as



     mg        00   :00                           dose, On           Medical



                                                  Wed            Branch



                                                  22 at           



                                                  1830,           



                                                  ASAP<br>Re           



                                                  ason for           



                                                  Anti-Infec           



                                                  tive:           



                                                  Documented           



                                                  Infection<           



                                                  br>Documen           



                                                  lily            



                                                  Infection           



                                                  Site:           



                                                  Urine<br>D           



                                                  uration of           



                                                  Therapy: 7           



                                                  days           

 

     doxycycline      2022- No             100mg      100 mg,           U

nivers



     hyclate                                Oral,           ity of



     (Vibramycin      23:30: 00:11                          ONCE, 1           Te

x[place]



     ) capsule      00   :00                           dose, On           Medica

l



     100 mg                                         Wed            Branch



                                                  22 at           



                                                  1830,           



                                                  ASAP<br>Re           



                                                  ason for           



                                                  Anti-Infec           



                                                  tive:           



                                                  Empiric           



                                                  Therapy           



                                                  for            



                                                  Suspected           



                                                  Infection<           



                                                  br>Empiric           



                                                  Therapy           



                                                  Site:           



                                                  Urine<br>D           



                                                  uration of           



                                                  therapy: 7           



                                                  days           

 

     doxycycline      2022- No        [id number] 100mg      Take 1          

 Univers



     hyclate 100                                capsule by           i

ty of



     mg capsule      00:00: 04:59                          mouth in           Te

[place]



               00   :00                           the            Medical



                                                  morning           Branch



                                                  and 1           



                                                  capsule in           



                                                  the            



                                                  evening.           



                                                  Do all           



                                                  this for 7           



                                                  days.           

 

     metroNIDAZO      2022- No        [id number] 500mg      Take 1          

 Univers



      mg                                tablet by           ity 

of



     tablet      00:00: 04:59                          mouth in           [place]



               00   :00                           the            Medical



                                                  morning           Branch



                                                  and 1           



                                                  tablet in           



                                                  the            



                                                  evening.           



                                                  Do all           



                                                  this for 7           



                                                  days.           

 

     cefTRIAXone            Yes            500mg      500 mg,           Un

shira



     (ROCEPHIN)      2-16                               Intramuscu           ity

 of



     injection      22:00:                               lar, Q24H,           Te

xas



     500 mg      00                                 First dose           Medical



                                                  on Wed           Branch



                                                  22 at           



                                                  1600,           



                                                  Until           



                                                  Discontinu           



                                                  ed,            



                                                  ASAP<br>Re           



                                                  ason for           



                                                  Anti-Infec           



                                                  tive:           



                                                  Empiric           



                                                  Therapy           



                                                  for            



                                                  Suspected           



                                                  Infection<           



                                                  br>Empiric           



                                                  Therapy           



                                                  Site:           



                                                  Pelvic<br>           



                                                  Duration           



                                                  of             



                                                  therapy:           



                                                  72 hours           

 

     azithromyci       No             1000mg      1,000 mg,         

  Univers



     n                                   Oral,           ity of



     (ZITHROMAX)      22:00: 21:04                          ONCE, 1           Te

xas



     tablet      00   :00                           dose, On           Medical



     1,000 mg                                         Wed            Branch



                                                  22 at           



                                                  1600,           



                                                  ASAP<br>Re           



                                                  ason for           



                                                  Anti-Infec           



                                                  tive:           



                                                  Empiric           



                                                  Therapy           



                                                  for            



                                                  Suspected           



                                                  Infection<           



                                                  br>Empiric           



                                                  Therapy           



                                                  Site:           



                                                  Pelvic<br>           



                                                  Duration           



                                                  of             



                                                  therapy:           



                                                  72 hours           

 

     No known            No                                      Univers



     medications                                                    ity of



               14:50:                                              [address]







Vital Signs







             Vital Name   Observation Time Observation Value Comments     Source

 

             Systolic blood 2022 22:36:00 140 mm[Hg]                Univer

sity of



             [facility]

 

             Diastolic blood 2022 22:36:00 87 mm[Hg]                 [facility]

 

             Heart rate   2022 22:36:00 92 /min                   [facility]

 

             Body temperature 2022 22:36:00 36.61 Gilda                 [facility]

 

             Respiratory rate 2022 22:36:00 14 /min                   [facility]

 

             Body height  2022 22:36:00 180.3 cm                  [facility]

 

             Body weight  2022 22:36:00 86.183 kg                 [facility]

 

             BMI          2022 22:36:00 26.50 kg/m2               [facility]

 

             Oxygen saturation in 2022 22:36:00 99 /min                   

[facility]



             Arterial blood by                                        [place] [facility]

kirsten



             Pulse oximetry                                        Branch

 

             Systolic blood 2022 20:49:00 153 mm[Hg]                Univer

sity of



             [facility]

 

             Diastolic blood 2022 20:49:00 86 mm[Hg]                 [facility]

[facility]

 

             Heart rate   2022 20:49:00 81 /min                   [facility]

 

             Body temperature 2022 20:49:00 37.22 Gilda                 [facility]

[facility]

 

             Respiratory rate 2022 20:49:00 16 /min                   [facility]

 

             Body height  2022 20:49:00 180.3 cm                  [facility]

 

             Body weight  2022 20:49:00 90.719 kg                 [facility]

 

             BMI          2022 20:49:00 27.89 kg/m2               [facility]

 

             Oxygen saturation in 2022 20:49:00 98 /min                   

[facility]



             Arterial blood by                                        [place] [facility]

kirsten



             Pulse oximetry                                        [place]







Procedures







                Procedure       Date / Time Performed Performing Clinician Sourc

e

 

                URINALYSIS      2022 22:43:00 Gail Saleh [facility]

 

                CONSENT/REFUSAL FOR 2022 22:25:00 Doctor Unassigned, No [facility]



                DIAGNOSIS AND                   Name            Medical Branch



                TREATMENT                                       

 

                NOTICE OF PRIVACY 2022 20:48:28 Doctor Unassigned, No [facility]

[facility] [place]



                PRACTICES                       Name            Medical Branch

 

                CONSENT/REFUSAL FOR 2022 20:45:33 Doctor Unassigned, No [facility]

[facility]



                DIAGNOSIS AND                   Name            Medical Branch



                TREATMENT                                       







Encounters







        Start   End     Encounter Admission Attending Care    Care    Encounter 

Source



        Date/Time Date/Time Type    Type    Clinicians Facility Department ID   

   

 

        2022 Emergency X       ANA MARÍA [facility]    ERT     [id number]

[id number] Univers



        17:43:00 19:52:00                 VERO bowens [facility]

 

        2022 Emergency         Ana María [facility]    [id number]

[id number] Univers



        17:43:00 19:52:00                 Vero ANDRE [id number]        

 gogo [facility] [id number]         [facility]  [phone]         Medi

kirsten



                                                        084             Branch

 

        2022 Emergency         Delfino [facility]    [id number]

[id number] Univers



        14:52:00 15:43:00                 Gail ANDRE [id number]         

gogo [facility] [id number]         [facility]  [phone]         [address]

 

        2022 Emergency X       DELFINO [facility]    ERT     [id number]

[id number] Univers



        14:52:00 15:43:00                 GAIL bowens [facility]

 

        2020 [facility]         [facility]    [id number]

[id number] 



        14:10:58 15:32:00                 Moira Andre [id number]         



                                                [place] [id number]         



                                                [place]  [phone]         



                                                        [facility]             

 

        2020 Emergency X       [facility]     [id number]

[id number] Univers



        14:10:58 14:10:58                 MOIRA bowens [facility]







Results

This patient has no known results.

## 2023-08-17 NOTE — ER
Nurse's Notes                                                                                     

 [facility]                                                                 

Name: Theron Dowell                                                                           

Age: 32 yrs                                                                                       

Sex: Male                                                                                         

: 1990                                                                                   

MRN: [id number]                                                                                   

Arrival Date: 2023                                                                          

Time: 14:51                                                                                       

Account#: [id number]                                                                            

Bed 7                                                                                             

Private MD:                                                                                       

Diagnosis: Nonspecific urethritis;Dysuria                                                         

                                                                                                  

Presentation:                                                                                     

                                                                                             

15:12 Chief complaint: Patient states: PENILE DISCHARGE AND DYSURIA. Coronavirus screen: At   cm10

      this time, the client does not indicate any symptoms associated with coronavirus-19.        

      Ebola Screen: No symptoms or risks identified at this time. Initial Sepsis Screen: Does     

      the patient meet any 2 criteria? No. Patient's initial sepsis screen is negative. Does      

      the patient have a suspected source of infection? No. Patient's initial sepsis screen       

      is negative. Risk Assessment: Do you want to hurt yourself or someone else? Patient         

      reports no desire to harm self or others. Onset of symptoms is unknown.                     

15:12 Method Of Arrival: Ambulatory                                                           cm10

15:12 Acuity: YG 4                                                                           cm10

                                                                                                  

Triage Assessment:                                                                                

15:51 Neuro: Level of Consciousness is awake, alert, obeys commands, Oriented to person,      ap3 

      place, time, situation. Cardiovascular: Patient's skin is warm and dry. Respiratory: No     

      deficits noted. Airway is patent Respiratory effort is even, unlabored, Respiratory         

      pattern is regular, symmetrical. GI: No deficits noted.                                     

15:55 General: Appears in no apparent distress. comfortable, Behavior is calm, cooperative,   ld1 

      appropriate for age. Pain: Denies pain. EENT: No signs and/or symptoms were reported        

      regarding the EENT system.                                                                  

                                                                                                  

Historical:                                                                                       

- Allergies:                                                                                      

15:50 No Known Allergies;                                                                     ap3 

- Home Meds:                                                                                      

15:50 None [Active];                                                                          ap3 

- PMHx:                                                                                           

15:50 None;                                                                                   ap3 

                                                                                                  

- Social history:: Smoking status: unknown.                                                       

- Family history:: not pertinent.                                                                 

                                                                                                  

                                                                                                  

Screening:                                                                                        

15:51 [facility] ED Fall Risk Assessment (Adult) History of falling in the last 3 months,       ap3 

      including since admission No falls in past 3 months (0 pts). Abuse screen: Denies           

      threats or abuse. Nutritional screening: No deficits noted. Tuberculosis screening: No      

      symptoms or risk factors identified.                                                        

                                                                                                  

Assessment:                                                                                       

15:55 Reassessment: No changes from previously documented assessment. Patient is alert,       ld1 

      oriented x 3, equal unlabored respirations, skin warm/dry/pink. See triage assessment.      

                                                                                                  

Vital Signs:                                                                                      

15:12  / 94; Pulse 85; Resp 16; Temp 98; Pulse Ox 97% ; Weight 90.72 kg; Height 5 ft.   cm10

      11 in. ;                                                                                    

15:55  / 89; Pulse 81; Resp 18; Pulse Ox 98% on R/A;                                    ld1 

15:12 Body Mass Index 27.89 (90.72 kg, 180.34 cm)                                             cm10

                                                                                                  

ED Course:                                                                                        

14:53 Patient arrived in ED.                                                                  ts1 

14:57 Mervin Duarte MD is Attending Physician.                                             malinda 

15:13 Triage completed.                                                                       cm10

15:27 Pregnancy Test, Urine Sent.                                                             ld1 

15:50 Arm band placed on right wrist.                                                         ap3 

15:51 Patient has correct armband on for positive identification. Bed in low position. Call   ap3 

      light in reach. Provided Education on: discharge instructions.                              

15:54 Linnea Seo, RN is Primary Nurse.                                                      ld1 

15:55 No provider procedures requiring assistance completed. Patient did not have IV access   ld1 

      during this emergency room visit.                                                           

                                                                                                  

Administered Medications:                                                                         

15:32 CANCELLED (Duplicate Order): NS 0.9% IV 1000 ml IV at 1 bolus Per protocol; 1000 mL     malinda 

      bolus                                                                                       

15:54 Drug: AZITHromycin PO 1 grams Route: PO;                                                ld1 

15:54 Drug: Doxycycline  mg Route: PO;                                                  ld1 

15:55 Drug: Rocephin (cefTRIAXone) IM 1 grams Route: IM; Site: left deltoid;                  ld1 

                                                                                                  

                                                                                                  

Medication:                                                                                       

15:51 VIS not applicable for this client.                                                     ap3 

                                                                                                  

Outcome:                                                                                          

15:41 Discharge ordered by MD.                                                                [facility] 

15:55 Discharged to home ambulatory.                                                          ld1 

15:55 Condition: stable                                                                           

15:55 Discharge instructions given to patient, Instructed on discharge instructions, follow       

      up and referral plans. medication usage, Demonstrated understanding of instructions,        

      follow-up care, medications, Prescriptions given X 2.                                       

15:56 Patient left the ED.                                                                    ld1 

                                                                                                  

Signatures:                                                                                       

Mervin Duarte MD MD cha Prokisch, Amanda, RN                    RN   ap3                                                  

Linnea Seo, RN                        RN   ld1                                                  

Stephanie Post PAS PAS  ts1                                                  

Makenna Fernández, RN                  RN   cm10                                                 

                                                                                                  

**************************************************************************************************